# Patient Record
Sex: MALE | Race: WHITE | NOT HISPANIC OR LATINO | URBAN - METROPOLITAN AREA
[De-identification: names, ages, dates, MRNs, and addresses within clinical notes are randomized per-mention and may not be internally consistent; named-entity substitution may affect disease eponyms.]

---

## 2023-04-07 ENCOUNTER — APPOINTMENT (OUTPATIENT)
Dept: LAB | Facility: HOSPITAL | Age: 33
End: 2023-04-07
Attending: OTOLARYNGOLOGY

## 2023-04-07 DIAGNOSIS — Z01.818 PRE-OPERATIVE EXAMINATION: ICD-10-CM

## 2023-04-07 DIAGNOSIS — J34.2 NASAL SEPTAL DEVIATION: ICD-10-CM

## 2023-04-07 DIAGNOSIS — J34.3 NASAL TURBINATE HYPERTROPHY: ICD-10-CM

## 2023-04-07 LAB
ANION GAP SERPL CALCULATED.3IONS-SCNC: 6 MMOL/L (ref 4–13)
BASOPHILS # BLD AUTO: 0.02 THOUSANDS/ÂΜL (ref 0–0.1)
BASOPHILS NFR BLD AUTO: 0 % (ref 0–1)
BUN SERPL-MCNC: 13 MG/DL (ref 5–25)
CALCIUM SERPL-MCNC: 10 MG/DL (ref 8.4–10.2)
CHLORIDE SERPL-SCNC: 105 MMOL/L (ref 96–108)
CO2 SERPL-SCNC: 30 MMOL/L (ref 21–32)
CREAT SERPL-MCNC: 1.03 MG/DL (ref 0.6–1.3)
EOSINOPHIL # BLD AUTO: 0.03 THOUSAND/ÂΜL (ref 0–0.61)
EOSINOPHIL NFR BLD AUTO: 1 % (ref 0–6)
ERYTHROCYTE [DISTWIDTH] IN BLOOD BY AUTOMATED COUNT: 12.4 % (ref 11.6–15.1)
GFR SERPL CREATININE-BSD FRML MDRD: 94 ML/MIN/1.73SQ M
GLUCOSE SERPL-MCNC: 63 MG/DL (ref 65–140)
HCT VFR BLD AUTO: 45.1 % (ref 36.5–49.3)
HGB BLD-MCNC: 15.5 G/DL (ref 12–17)
IMM GRANULOCYTES # BLD AUTO: 0.01 THOUSAND/UL (ref 0–0.2)
IMM GRANULOCYTES NFR BLD AUTO: 0 % (ref 0–2)
LYMPHOCYTES # BLD AUTO: 1.48 THOUSANDS/ÂΜL (ref 0.6–4.47)
LYMPHOCYTES NFR BLD AUTO: 33 % (ref 14–44)
MCH RBC QN AUTO: 31.4 PG (ref 26.8–34.3)
MCHC RBC AUTO-ENTMCNC: 34.4 G/DL (ref 31.4–37.4)
MCV RBC AUTO: 91 FL (ref 82–98)
MONOCYTES # BLD AUTO: 0.52 THOUSAND/ÂΜL (ref 0.17–1.22)
MONOCYTES NFR BLD AUTO: 12 % (ref 4–12)
NEUTROPHILS # BLD AUTO: 2.44 THOUSANDS/ÂΜL (ref 1.85–7.62)
NEUTS SEG NFR BLD AUTO: 54 % (ref 43–75)
NRBC BLD AUTO-RTO: 0 /100 WBCS
PLATELET # BLD AUTO: 255 THOUSANDS/UL (ref 149–390)
PMV BLD AUTO: 9.9 FL (ref 8.9–12.7)
POTASSIUM SERPL-SCNC: 3.7 MMOL/L (ref 3.5–5.3)
RBC # BLD AUTO: 4.94 MILLION/UL (ref 3.88–5.62)
SODIUM SERPL-SCNC: 141 MMOL/L (ref 135–147)
WBC # BLD AUTO: 4.5 THOUSAND/UL (ref 4.31–10.16)

## 2023-04-24 RX ORDER — DIPHENOXYLATE HYDROCHLORIDE AND ATROPINE SULFATE 2.5; .025 MG/1; MG/1
1 TABLET ORAL DAILY
COMMUNITY

## 2023-04-24 NOTE — PRE-PROCEDURE INSTRUCTIONS
Pre-Surgery Instructions:   Medication Instructions   • multivitamin (THERAGRAN) TABS Stop taking 7 days prior to surgery  Medication instructions for day surgery reviewed  Please use only a sip of water to take your instructed medications  Avoid all over the counter vitamins, supplements and NSAIDS for one week prior to surgery per anesthesia guidelines  Tylenol is ok to take as needed  You will receive a call one business day prior to surgery with an arrival time and hospital directions  If your surgery is scheduled on a Monday, the hospital will be calling you on the Friday prior to your surgery  If you have not heard from anyone by 8pm, please call the hospital supervisor through the hospital  at [phone]  Tiffanie Poll [phone])  Do not eat or drink anything after midnight the night before your surgery, including candy, mints, lifesavers, or chewing gum  Do not drink alcohol 24hrs before your surgery  Try not to smoke at least 24hrs before your surgery  Follow the pre surgery showering instructions as listed in the [facility] Surgical Experience Booklet” or otherwise provided by your surgeon's office  Do not shave the surgical area 24 hours before surgery  Do not apply any lotions, creams, including makeup, cologne, deodorant, or perfumes after showering on the day of your surgery  No contact lenses, eye make-up, or artificial eyelashes  Remove nail polish, including gel polish, and any artificial, gel, or acrylic nails if possible  Remove all jewelry including rings and body piercing jewelry  Wear causal clothing that is easy to take on and off  Consider your type of surgery  Keep any valuables, jewelry, piercings at home  Please bring any specially ordered equipment (sling, braces) if indicated  Arrange for a responsible person to drive you to and from the hospital on the day of your surgery  Visitor Guidelines discussed       Call the surgeon's office with any new illnesses, exposures, or additional questions prior to surgery  Please reference your [facility] Surgical Experience Booklet” for additional information to prepare for your upcoming surgery

## 2023-04-28 ENCOUNTER — ANESTHESIA (OUTPATIENT)
Dept: PERIOP | Facility: HOSPITAL | Age: 33
End: 2023-04-28

## 2023-04-28 ENCOUNTER — HOSPITAL ENCOUNTER (OUTPATIENT)
Facility: HOSPITAL | Age: 33
Setting detail: OUTPATIENT SURGERY
Discharge: HOME | End: 2023-04-28
Attending: OTOLARYNGOLOGY | Admitting: OTOLARYNGOLOGY

## 2023-04-28 ENCOUNTER — ANESTHESIA EVENT (OUTPATIENT)
Dept: PERIOP | Facility: HOSPITAL | Age: 33
End: 2023-04-28

## 2023-04-28 VITALS
DIASTOLIC BLOOD PRESSURE: 58 MMHG | WEIGHT: 215 LBS | TEMPERATURE: 98.2 F | RESPIRATION RATE: 22 BRPM | HEIGHT: 73 IN | OXYGEN SATURATION: 96 % | HEART RATE: 86 BPM | BODY MASS INDEX: 28.49 KG/M2 | SYSTOLIC BLOOD PRESSURE: 127 MMHG

## 2023-04-28 DIAGNOSIS — Z98.890 STATUS POST NASAL SEPTOPLASTY: Primary | ICD-10-CM

## 2023-04-28 RX ORDER — GLYCOPYRROLATE 0.2 MG/ML
INJECTION INTRAMUSCULAR; INTRAVENOUS AS NEEDED
Status: DISCONTINUED | OUTPATIENT
Start: 2023-04-28 | End: 2023-04-28

## 2023-04-28 RX ORDER — ACETAMINOPHEN 325 MG/1
650 TABLET ORAL EVERY 6 HOURS PRN
Status: DISCONTINUED | OUTPATIENT
Start: 2023-04-28 | End: 2023-04-28 | Stop reason: HOSPADM

## 2023-04-28 RX ORDER — HYDROCODONE BITARTRATE AND ACETAMINOPHEN 5; 325 MG/1; MG/1
1 TABLET ORAL EVERY 6 HOURS PRN
Status: DISCONTINUED | OUTPATIENT
Start: 2023-04-28 | End: 2023-04-28 | Stop reason: HOSPADM

## 2023-04-28 RX ORDER — OXYCODONE HYDROCHLORIDE AND ACETAMINOPHEN 5; 325 MG/1; MG/1
1 TABLET ORAL EVERY 4 HOURS PRN
Qty: 12 TABLET | Refills: 0 | Status: SHIPPED | OUTPATIENT
Start: 2023-04-28 | End: 2023-05-08

## 2023-04-28 RX ORDER — ONDANSETRON 2 MG/ML
4 INJECTION INTRAMUSCULAR; INTRAVENOUS ONCE AS NEEDED
Status: COMPLETED | OUTPATIENT
Start: 2023-04-28 | End: 2023-04-28

## 2023-04-28 RX ORDER — AMOXICILLIN AND CLAVULANATE POTASSIUM 875; 125 MG/1; MG/1
1 TABLET, FILM COATED ORAL EVERY 12 HOURS SCHEDULED
Qty: 14 TABLET | Refills: 0 | Status: SHIPPED | OUTPATIENT
Start: 2023-04-28 | End: 2023-05-05

## 2023-04-28 RX ORDER — LIDOCAINE HYDROCHLORIDE AND EPINEPHRINE 10; 10 MG/ML; UG/ML
INJECTION, SOLUTION INFILTRATION; PERINEURAL AS NEEDED
Status: DISCONTINUED | OUTPATIENT
Start: 2023-04-28 | End: 2023-04-28 | Stop reason: HOSPADM

## 2023-04-28 RX ORDER — NEOSTIGMINE METHYLSULFATE 1 MG/ML
INJECTION INTRAVENOUS AS NEEDED
Status: DISCONTINUED | OUTPATIENT
Start: 2023-04-28 | End: 2023-04-28

## 2023-04-28 RX ORDER — CEFAZOLIN SODIUM 2 G/50ML
SOLUTION INTRAVENOUS AS NEEDED
Status: DISCONTINUED | OUTPATIENT
Start: 2023-04-28 | End: 2023-04-28

## 2023-04-28 RX ORDER — OXYCODONE HYDROCHLORIDE AND ACETAMINOPHEN 5; 325 MG/1; MG/1
1 TABLET ORAL EVERY 4 HOURS PRN
Qty: 6 TABLET | Refills: 0 | Status: SHIPPED | OUTPATIENT
Start: 2023-04-28

## 2023-04-28 RX ORDER — PROPOFOL 10 MG/ML
INJECTION, EMULSION INTRAVENOUS AS NEEDED
Status: DISCONTINUED | OUTPATIENT
Start: 2023-04-28 | End: 2023-04-28

## 2023-04-28 RX ORDER — GINSENG 100 MG
CAPSULE ORAL AS NEEDED
Status: DISCONTINUED | OUTPATIENT
Start: 2023-04-28 | End: 2023-04-28 | Stop reason: HOSPADM

## 2023-04-28 RX ORDER — SODIUM CHLORIDE, SODIUM LACTATE, POTASSIUM CHLORIDE, CALCIUM CHLORIDE 600; 310; 30; 20 MG/100ML; MG/100ML; MG/100ML; MG/100ML
INJECTION, SOLUTION INTRAVENOUS CONTINUOUS PRN
Status: DISCONTINUED | OUTPATIENT
Start: 2023-04-28 | End: 2023-04-28

## 2023-04-28 RX ORDER — ROCURONIUM BROMIDE 10 MG/ML
INJECTION, SOLUTION INTRAVENOUS AS NEEDED
Status: DISCONTINUED | OUTPATIENT
Start: 2023-04-28 | End: 2023-04-28

## 2023-04-28 RX ORDER — LIDOCAINE HYDROCHLORIDE 20 MG/ML
INJECTION, SOLUTION EPIDURAL; INFILTRATION; INTRACAUDAL; PERINEURAL AS NEEDED
Status: DISCONTINUED | OUTPATIENT
Start: 2023-04-28 | End: 2023-04-28

## 2023-04-28 RX ORDER — HYDROMORPHONE HCL/PF 1 MG/ML
0.5 SYRINGE (ML) INJECTION
Status: DISCONTINUED | OUTPATIENT
Start: 2023-04-28 | End: 2023-04-28 | Stop reason: HOSPADM

## 2023-04-28 RX ORDER — FENTANYL CITRATE 50 UG/ML
INJECTION, SOLUTION INTRAMUSCULAR; INTRAVENOUS AS NEEDED
Status: DISCONTINUED | OUTPATIENT
Start: 2023-04-28 | End: 2023-04-28

## 2023-04-28 RX ORDER — MIDAZOLAM HYDROCHLORIDE 2 MG/2ML
INJECTION, SOLUTION INTRAMUSCULAR; INTRAVENOUS AS NEEDED
Status: DISCONTINUED | OUTPATIENT
Start: 2023-04-28 | End: 2023-04-28

## 2023-04-28 RX ORDER — COCAINE HYDROCHLORIDE 40 MG/ML
SOLUTION NASAL AS NEEDED
Status: DISCONTINUED | OUTPATIENT
Start: 2023-04-28 | End: 2023-04-28 | Stop reason: HOSPADM

## 2023-04-28 RX ORDER — DEXAMETHASONE SODIUM PHOSPHATE 10 MG/ML
INJECTION, SOLUTION INTRAMUSCULAR; INTRAVENOUS AS NEEDED
Status: DISCONTINUED | OUTPATIENT
Start: 2023-04-28 | End: 2023-04-28

## 2023-04-28 RX ORDER — METOCLOPRAMIDE HYDROCHLORIDE 5 MG/ML
10 INJECTION INTRAMUSCULAR; INTRAVENOUS ONCE AS NEEDED
Status: CANCELLED | OUTPATIENT
Start: 2023-04-28

## 2023-04-28 RX ORDER — ONDANSETRON 2 MG/ML
INJECTION INTRAMUSCULAR; INTRAVENOUS AS NEEDED
Status: DISCONTINUED | OUTPATIENT
Start: 2023-04-28 | End: 2023-04-28

## 2023-04-28 RX ORDER — FENTANYL CITRATE/PF 50 MCG/ML
25 SYRINGE (ML) INJECTION
Status: DISCONTINUED | OUTPATIENT
Start: 2023-04-28 | End: 2023-04-28 | Stop reason: HOSPADM

## 2023-04-28 RX ADMIN — MIDAZOLAM 2 MG: 1 INJECTION INTRAMUSCULAR; INTRAVENOUS at 15:30

## 2023-04-28 RX ADMIN — PROPOFOL 200 MG: 10 INJECTION, EMULSION INTRAVENOUS at 15:33

## 2023-04-28 RX ADMIN — DEXAMETHASONE SODIUM PHOSPHATE 10 MG: 10 INJECTION, SOLUTION INTRAMUSCULAR; INTRAVENOUS at 15:45

## 2023-04-28 RX ADMIN — GLYCOPYRROLATE 0.6 MG: 0.2 INJECTION, SOLUTION INTRAMUSCULAR; INTRAVENOUS at 16:47

## 2023-04-28 RX ADMIN — FENTANYL CITRATE 50 MCG: 50 INJECTION, SOLUTION INTRAMUSCULAR; INTRAVENOUS at 15:33

## 2023-04-28 RX ADMIN — ROCURONIUM BROMIDE 50 MG: 10 INJECTION INTRAVENOUS at 15:34

## 2023-04-28 RX ADMIN — ONDANSETRON 4 MG: 2 INJECTION INTRAMUSCULAR; INTRAVENOUS at 17:24

## 2023-04-28 RX ADMIN — NEOSTIGMINE METHYLSULFATE 3 MG: 1 INJECTION INTRAVENOUS at 16:47

## 2023-04-28 RX ADMIN — LIDOCAINE HYDROCHLORIDE 100 MG: 20 INJECTION, SOLUTION EPIDURAL; INFILTRATION; INTRACAUDAL; PERINEURAL at 15:33

## 2023-04-28 RX ADMIN — SODIUM CHLORIDE, SODIUM LACTATE, POTASSIUM CHLORIDE, AND CALCIUM CHLORIDE: .6; .31; .03; .02 INJECTION, SOLUTION INTRAVENOUS at 15:33

## 2023-04-28 RX ADMIN — CEFAZOLIN SODIUM 2000 MG: 2 SOLUTION INTRAVENOUS at 15:42

## 2023-04-28 RX ADMIN — ONDANSETRON 4 MG: 2 INJECTION INTRAMUSCULAR; INTRAVENOUS at 15:45

## 2023-04-28 RX ADMIN — FENTANYL CITRATE 50 MCG: 50 INJECTION, SOLUTION INTRAMUSCULAR; INTRAVENOUS at 15:39

## 2023-04-28 NOTE — OP NOTE
OPERATIVE REPORT  PATIENT NAME: Agapito Santana    :  1990  MRN: [id number]  Pt Location: [facility] OR ROOM 02    SURGERY DATE: 2023    Surgeon(s) and Role:     * Lloyd Forbes MD - Primary    Preop Diagnosis:  Nasal septal deviation [J34 2]  Nasal turbinate hypertrophy [J34 3]    Post-Op Diagnosis Codes:     * Nasal septal deviation [J34 2]     * Nasal turbinate hypertrophy [J34 3]    Procedure(s):  SEPTOPLASTY/ BILATERAL INFERIOR TURBINATE REDUCTION  Bilateral - BILATERAL INFERIOR TURBINATE REDUCTION    Specimen(s):  * No specimens in log *    Estimated Blood Loss:   Minimal    Drains:  * No LDAs found *    Anesthesia Type:   General    Operative Indications:  Nasal septal deviation [J34 2]  Nasal turbinate hypertrophy [J34 3]      Operative Findings:  Deviated septum and bilateral inferior turbinate hypertrophy    Complications:   None    Procedure and Technique:      Operative indications: The patient presents with longstanding nasal obstruction refractory to medical management  The risks, benefits and alternatives of septoplasty and bilateral inferior turbinate reduction were discussed preoperatively and signed informed consent was obtained  Operative course: The patient was identified and brought into the operating room and placed on the operating table in a supine position  General anesthesia was induced  A timeout was performed  The patient was prepped and draped in the usual fashion  The nasal septum was injected with 1% lidocaine with 1:100,000 epinephrine in a submucosal plane  4% soaked cocaine pledgets were inserted into the nose bilaterally  The pledgets were removed and the operation was begun  A hemitransfixion incision was made on the right side with a #15 blade  Submucoperichondrial flaps were elevated about the nasal septum bilaterally  The endoscope was introduced between these flaps   A portion of the quadrangular cartilage was resected with a swivel knife leaving greater than 1 cm caudal and dorsal struts for nasal tip support  Next, under endoscopic guidance, any additional bony or cartilaginous deviations were removed  Any deviation of the maxillary crest was freed from the overlying mucosa and reduced with an osteotome  Drainage holes were seen to be created on one mucoperichondrial flap  After adequate straightening of the septal bone and cartilage, the mucoperichondrial flaps were reapproximated with 4-0 chromic suture in a quilting fashion  The hemitransfixion incision was closed with interrupted 4-0 chromic sutures  Next, under endoscopic guidance a bilateral inferior turbinate reduction was performed  The turbinates were injected bilaterally with 1% lidocaine with 1:100,000 epinephrine  Bilateral submucosal tunnels were created along the long-axis of the turbinates with the Turbinator wand and any excess stromal soft tissue and bone was submucosally removed  Hemostasis was achieved with cautery  The turbinates were gently lateralized with a [place] bar  Bilateral Will splints were then covered in Bacitracin and inserted and secured to the nasal septum with 3-0 prolene suture  The patient was then gently awakened from general anesthesia and transported to the PACU in stable condition  The procedure was tolerated well  I was present for the entire procedure      Patient Disposition:  PACU         SIGNATURE: Zain Rebollar MD  DATE: April 28, 2023  TIME: 4:58 PM

## 2023-04-28 NOTE — ANESTHESIA PREPROCEDURE EVALUATION
Procedure:  SEPTOPLASTY/ BILATERAL INFERIOR TURBINATE REDUCTION (Nose)  BILATERAL INFERIOR TURBINATE REDUCTION (Bilateral: Nose)    Relevant Problems   No relevant active problems   Recent URI with residual Cuogh     Physical Exam    Airway    Mallampati score: II  TM Distance: >3 FB  Neck ROM: full     Dental   No notable dental hx     Cardiovascular      Pulmonary  Breath sounds clear to auscultation,     Other Findings        Anesthesia Plan  ASA Score- 2     Anesthesia Type- general with ASA Monitors  Additional Monitors:   Airway Plan: ETT  Plan Factors-Exercise tolerance (METS): >4 METS  Chart reviewed  Patient is not a current smoker  Induction- intravenous  Postoperative Plan-     Informed Consent- Anesthetic plan and risks discussed with patient and spouse  I personally reviewed this patient with the CRNA  Discussed and agreed on the Anesthesia Plan with the CRNA  Harley Yee

## 2023-04-28 NOTE — INTERVAL H&P NOTE
"/77   Pulse 75   Temp 97 5 °F (36 4 °C) (Temporal)   Resp 18   Ht 6' 1\" (1 854 m)   Wt 97 5 kg (215 lb)   SpO2 100%   BMI 28 37 kg/m²   Head: atruamatic, normencephalic  CV: RRR  RESP: CTAB  ABD: soft and supple  Extremities: no cyanosis, clubbing or edema    H&P reviewed  After examining the patient I find no changes in the patients condition since the H&P had been written      Vitals:    04/28/23 1343   BP: 133/77   Pulse: 75   Resp: 18   Temp: 97 5 °F (36 4 °C)   SpO2: 100%     "

## 2023-04-28 NOTE — ANESTHESIA POSTPROCEDURE EVALUATION
Post-Op Assessment Note    CV Status:  Stable  Pain Score: 0    Pain management: adequate     Mental Status:  Arousable   Hydration Status:  Stable   PONV Controlled:  None   Airway Patency:  Patent      Post Op Vitals Reviewed: Yes      Staff: CRNA         No notable events documented      BP      Temp      Pulse     Resp      SpO2

## 2023-04-28 NOTE — DISCHARGE INSTR - AVS FIRST PAGE
General directions: You have just had sinus/nasal surgery  You will likely have light bleeding from both your nostrils  You can tape gauze under your nostrils to catch the bleeding  Light bleeding is expected to improved within 24-72 hours following surgery  You will likely find it helpful to sleep somewhat inclined in bed or on a recliner for the next several days  This will decrease your sinus pressure and help the blood drain better  About a 30 degree angle is ideal   Try to avoid brisk activity and heavy lifting  These activities increase bleeding from your nose  Activity:  Light activity, no nose blowing, sneeze with your mouth open, change nasal drip pad as necessary, take your antibiotics as scheduled    Diet: Resume your regular diet    Special Instructions: Call or return with questions, concerns, or return of symptoms and No alcoholic drinks, driving, or operating heavy machinery while on prescribed pain medications    Call your physician for:  Shortness of breath that does not improve, Fever above 101ºF (38  3ºC) or shaking chills, Increasing sputum, Chest pain develops or worsens, Pain not controlled by the medication prescribed for you, Increased redness, swelling or drainage around your wound   or Questions or concerns

## 2023-04-28 NOTE — H&P
Sally Gillespie is a 35 y o  who presents with a chief complaint of nasal obstruction     Pertinent elements of the history include:  He presents with nasal obstruction  Alternates from side to side  History of nasal fracture in high school  Had surgery to reconstruct it at the time  Some scabbing and bleeding anteriorly, usually on the left side  Denies nasal allergies  Some post nasal drip  Some URIs from PND but this has improved with age  AM headaches, improves with hydration  Headache is vertex based  Correlates with nasal obstruction  For his symptoms he has tried saline spray, helps with bleeding  He has not tried nasal steroids in the past  Both sides are equally obstruction         Review of systems: Pertinent review of systems documented in the HPI  10 point ROS documented in a separate note, as necessary      Results reviewed; images from any scan have been personally reviewed:           The past medical, surgical, social and family history have been reviewed as documented in today's record      Medical History   History reviewed  No pertinent past medical history         Surgical History         Past Surgical History:   Procedure Laterality Date   • NASAL RECONSTRUCTION       • WISDOM TOOTH EXTRACTION   2007            Family History   History reviewed  No pertinent family history         Social History               Socioeconomic History   • Marital status: Unknown       Spouse name: Not on file   • Number of children: Not on file   • Years of education: Not on file   • Highest education level: Not on file   Occupational History   • Not on file   Tobacco Use   • Smoking status: Not on file   • Smokeless tobacco: Not on file   Vaping Use   • Vaping Use: Never used   Substance and Sexual Activity   • Alcohol use:  Yes       Comment: socially   • Drug use: Not on file   • Sexual activity: Not on file   Other Topics Concern   • Not on file   Social History Narrative   • Not on file      Social Determinants of Health      Financial Resource Strain: Not on file   Food Insecurity: Not on file   Transportation Needs: Not on file   Physical Activity: Not on file   Stress: Not on file   Social Connections: Not on file   Intimate Partner Violence: Not on file   Housing Stability: Not on file            No current outpatient medications on file prior to visit       No current facility-administered medications on file prior to visit          Physical exam:   There were no vitals taken for this visit      Constitutional:  Well developed, well nourished and groomed, in no acute distress       Eyes:  Extra-ocular movements intact, pupils equally round and reactive to light and accommodation, the lids and conjunctivae are normal in appearance      Head: Atraumatic, normocephalic, no visible scalp lesions, bony palpation unremarkable without stepoffs, parotid and submandibular salivary glands non-tender to palpation and without masses bilaterally       Ears:  Auricles normal in appearance bilaterally, mastoid prominence non-tender, external auditory canals clear bilaterally  Tympanic membranes intact  Normal appearing ossicles       Nose/Sinuses:  External appearance unremarkable, no maxillary or frontal sinus tenderness to palpation bilaterally   Anterior rhinoscopy reveals: bilateral mid septal cartilaginous deflection with mucoid edema of the inferior turbinates/hypertrophy and mucoid exudate     Oral Cavity:  Moist mucus membranes, gums and dentition unremarkable, no oral mucosal masses or lesions, floor of mouth soft, tongue mobile without masses or lesions       Oropharynx:  Base of tongue soft and without masses, tonsils bilaterally unremarkable, soft palate mucosa unremarkable        Neck:  No visible or palpable cervical lesions or lymphadenopathy, thyroid gland is normal in size and symmetry and without masses, normal laryngeal elevation with swallowing       Cardiovascular:  Normal rate and rhythm, no palpable thrills, no jugulovenous distension observed  Respiratory:  Normal respiratory effort without evidence of retractions or use of accessory muscles  Integument:  Normal appearing without observed masses or lesions  Neurologic:  Cranial nerves II-XII intact bilaterally  Psychiatric:  Alert and oriented to time, place and person, normal affect      Procedures           Assessment:   1  Nasal septal deviation          2  Nasal turbinate hypertrophy                Orders  No orders of the defined types were placed in this encounter            Discussion/Plan:     1  He presents with long-standing posttraumatic nasal obstruction bilaterally with associated mucoid edema and turbinate hypertrophy  I recommended a septoplasty and bilateral inferior turbinate reduction  We discussed the surgery in detail  There is the remote possibility that he had prior septal surgery as part of his posttraumatic reconstruction although there is not clear evidence of this on exam   Surgical risks include bleeding, infection, recurrence, scarring, septal perforation, saddle nose deformity, and the need for additional surgery  His informed consent was obtained

## 2023-04-30 NOTE — ANESTHESIA POSTPROCEDURE EVALUATION
Post-Op Assessment Note        Airway: intubated       Reason for prolonged intubation > 24 hours:  Ectubated at end of surgeryReason for prolonged intubation > 48 hours:  Extubated at end of surgery  No notable events documented      BP      Temp      Pulse     Resp      SpO2

## 2024-05-15 NOTE — PROGRESS NOTES
[facility]   Keira COREY    Assessment/Plan:   1. Establishing care with new doctor, encounter for  2. Foot pain, left  -     XR foot 3+ vw left; Future; Expected date: 05/16/2024  3. Screening for thyroid disorder  -     TSH, 3rd generation with Free T4 reflex; Future  -     TSH, 3rd generation with Free T4 reflex  4. Screening for deficiency anemia  -     CBC and differential; Future  -     CBC and differential  5. Screening for lipid disorders  -     Lipid panel; Future  -     Lipid panel  6. Vitamin D deficiency  -     Vitamin D 25 hydroxy; Future  -     Vitamin D 25 hydroxy  7. Hypoglycemia  -     Comprehensive metabolic panel; Future  -     Comprehensive metabolic panel      Blood work no eating/caffeine for 8 hours.  Drink plenty of water  Left foot xray.  Consider podiatry consult.   Return for Annual physical, F/U after bloodwork.  Patient may call or return to office with any questions or concerns.     ______________________________________________________________________  Subjective:     Patient ID: Anders King is a 34 y.o. male.  Anders King  Chief Complaint   Patient presents with    Establish Care     Here to establish care.  Hasn't seen a PCP in a few years    PMH: none  SHx: deviated septum, ureter surgery at 4yr old.  FHx: dad with MS/AD, mom with UC.  Brother arthritis, half sister (unclear)     with 2 kids 4yr girl and 10 month old son.    Work:  self employed   Physical:  yardwork, kids, house work.   Tobacco: none  Alcohol: few drinks a week  Marijuana: none  Diet:  intermittent diet.  Feels better when he skips breakfast.  Organic and limits sugar.  Drinks 32 ounces regular coffee daily    Reports chronic left foot pain ball of foot between 2nd/3rd toes.  On his feet a lot with work and thinks it was due to repetitive motion injury initially, onset 1yr ago.  Tried shoe inserts with increased pain, started wearing flat soled shoes with  improvement, however no resolution.  Pain intermittent and persistent.                   The following portions of the patient's history were reviewed and updated as appropriate: allergies, current medications, past family history, past medical history, past social history, past surgical history, and problem list.    Review of Systems   Constitutional: Negative.  Negative for activity change, appetite change, chills, fatigue, fever and unexpected weight change.   HENT: Negative.  Negative for congestion, ear pain, postnasal drip, sinus pain and trouble swallowing.    Eyes:  Positive for visual disturbance.        Wears corrected lens   Respiratory: Negative.  Negative for cough and shortness of breath.    Cardiovascular: Negative.  Negative for chest pain and leg swelling.   Gastrointestinal:  Negative for constipation and diarrhea.   Endocrine: Negative.    Genitourinary: Negative.  Negative for difficulty urinating and dysuria.   Musculoskeletal: Negative.    Skin: Negative.    Allergic/Immunologic: Negative.  Negative for immunocompromised state.   Neurological:  Positive for headaches. Negative for dizziness and light-headedness.        Wakes up with occasional dull HA.  Trying to stay hydrated.     Hematological: Negative.    Psychiatric/Behavioral: Negative.  Negative for sleep disturbance.          Objective:      Vitals:    05/16/24 0949   BP: 122/80   Pulse: 69   SpO2: 99%      Physical Exam  Vitals and nursing note reviewed.   Constitutional:       Appearance: Normal appearance.   HENT:      Head: Normocephalic and atraumatic.      Right Ear: Tympanic membrane, ear canal and external ear normal.      Left Ear: Tympanic membrane, ear canal and external ear normal.      Nose: Nose normal.      Mouth/Throat:      Mouth: Mucous membranes are moist.      Pharynx: Oropharynx is clear.   Eyes:      Extraocular Movements: Extraocular movements intact.      Conjunctiva/sclera: Conjunctivae normal.      Pupils:  "Pupils are equal, round, and reactive to light.   Cardiovascular:      Rate and Rhythm: Normal rate and regular rhythm.      Pulses: Normal pulses.      Heart sounds: Normal heart sounds.   Pulmonary:      Effort: Pulmonary effort is normal.      Breath sounds: Normal breath sounds.   Abdominal:      General: Bowel sounds are normal.      Palpations: Abdomen is soft.   Musculoskeletal:         General: Normal range of motion.      Cervical back: Normal range of motion and neck supple.        Feet:    Feet:      Comments: Tenderness at MTPJs of foot between 2-3 toes.  No nodularity with palpation.    Flat arches bilaterally  Skin:     General: Skin is warm and dry.   Neurological:      General: No focal deficit present.      Mental Status: He is alert and oriented to person, place, and time.   Psychiatric:         Mood and Affect: Mood normal.         Behavior: Behavior normal.         Thought Content: Thought content normal.         Judgment: Judgment normal.           Portions of the record may have been created with voice recognition software. Occasional wrong word or \"sound alike\" substitutions may have occurred due to the inherent limitations of voice recognition software. Please review the chart carefully and recognize, using context, where substitutions/typographical errors may have occurred.       "

## 2024-05-16 ENCOUNTER — OFFICE VISIT (OUTPATIENT)
Dept: FAMILY MEDICINE CLINIC | Facility: HOSPITAL | Age: 34
End: 2024-05-16
Payer: COMMERCIAL

## 2024-05-16 VITALS
HEIGHT: 73 IN | BODY MASS INDEX: 27.04 KG/M2 | WEIGHT: 204 LBS | HEART RATE: 69 BPM | SYSTOLIC BLOOD PRESSURE: 122 MMHG | DIASTOLIC BLOOD PRESSURE: 80 MMHG | OXYGEN SATURATION: 99 %

## 2024-05-16 DIAGNOSIS — Z13.220 SCREENING FOR LIPID DISORDERS: ICD-10-CM

## 2024-05-16 DIAGNOSIS — Z13.0 SCREENING FOR DEFICIENCY ANEMIA: ICD-10-CM

## 2024-05-16 DIAGNOSIS — M79.672 FOOT PAIN, LEFT: ICD-10-CM

## 2024-05-16 DIAGNOSIS — Z76.89 ESTABLISHING CARE WITH NEW DOCTOR, ENCOUNTER FOR: Primary | ICD-10-CM

## 2024-05-16 DIAGNOSIS — E16.2 HYPOGLYCEMIA: ICD-10-CM

## 2024-05-16 DIAGNOSIS — Z13.29 SCREENING FOR THYROID DISORDER: ICD-10-CM

## 2024-05-16 DIAGNOSIS — E55.9 VITAMIN D DEFICIENCY: ICD-10-CM

## 2024-05-16 PROCEDURE — 99203 OFFICE O/P NEW LOW 30 MIN: CPT | Performed by: NURSE PRACTITIONER

## 2024-05-29 ENCOUNTER — APPOINTMENT (OUTPATIENT)
Dept: RADIOLOGY | Facility: CLINIC | Age: 34
End: 2024-05-29
Payer: COMMERCIAL

## 2024-05-29 DIAGNOSIS — M79.672 FOOT PAIN, LEFT: ICD-10-CM

## 2024-05-29 PROCEDURE — 73630 X-RAY EXAM OF FOOT: CPT

## 2024-05-30 DIAGNOSIS — G89.29 CHRONIC FOOT PAIN, LEFT: Primary | ICD-10-CM

## 2024-05-30 DIAGNOSIS — M79.672 CHRONIC FOOT PAIN, LEFT: Primary | ICD-10-CM

## 2024-05-30 LAB
25(OH)D3+25(OH)D2 SERPL-MCNC: 31.4 NG/ML (ref 30–100)
ALBUMIN SERPL-MCNC: 4.6 G/DL (ref 4.1–5.1)
ALBUMIN/GLOB SERPL: 2.2 {RATIO} (ref 1.2–2.2)
ALP SERPL-CCNC: 59 IU/L (ref 44–121)
ALT SERPL-CCNC: 9 IU/L (ref 0–44)
AST SERPL-CCNC: 16 IU/L (ref 0–40)
BASOPHILS # BLD AUTO: 0 X10E3/UL (ref 0–0.2)
BASOPHILS NFR BLD AUTO: 1 %
BILIRUB SERPL-MCNC: 0.7 MG/DL (ref 0–1.2)
BUN SERPL-MCNC: 10 MG/DL (ref 6–20)
BUN/CREAT SERPL: 11 (ref 9–20)
CALCIUM SERPL-MCNC: 9.6 MG/DL (ref 8.7–10.2)
CHLORIDE SERPL-SCNC: 105 MMOL/L (ref 96–106)
CHOLEST SERPL-MCNC: 155 MG/DL (ref 100–199)
CHOLEST/HDLC SERPL: 2.8 RATIO (ref 0–5)
CO2 SERPL-SCNC: 23 MMOL/L (ref 20–29)
CREAT SERPL-MCNC: 0.89 MG/DL (ref 0.76–1.27)
EGFR: 115 ML/MIN/1.73
EOSINOPHIL # BLD AUTO: 0.1 X10E3/UL (ref 0–0.4)
EOSINOPHIL NFR BLD AUTO: 2 %
ERYTHROCYTE [DISTWIDTH] IN BLOOD BY AUTOMATED COUNT: 12.1 % (ref 11.6–15.4)
GLOBULIN SER-MCNC: 2.1 G/DL (ref 1.5–4.5)
GLUCOSE SERPL-MCNC: 93 MG/DL (ref 70–99)
HCT VFR BLD AUTO: 40.5 % (ref 37.5–51)
HDLC SERPL-MCNC: 56 MG/DL
HGB BLD-MCNC: 13.9 G/DL (ref 13–17.7)
IMM GRANULOCYTES # BLD: 0 X10E3/UL (ref 0–0.1)
IMM GRANULOCYTES NFR BLD: 0 %
LDLC SERPL CALC-MCNC: 90 MG/DL (ref 0–99)
LYMPHOCYTES # BLD AUTO: 1.2 X10E3/UL (ref 0.7–3.1)
LYMPHOCYTES NFR BLD AUTO: 34 %
MCH RBC QN AUTO: 31.8 PG (ref 26.6–33)
MCHC RBC AUTO-ENTMCNC: 34.3 G/DL (ref 31.5–35.7)
MCV RBC AUTO: 93 FL (ref 79–97)
MONOCYTES # BLD AUTO: 0.4 X10E3/UL (ref 0.1–0.9)
MONOCYTES NFR BLD AUTO: 12 %
NEUTROPHILS # BLD AUTO: 1.7 X10E3/UL (ref 1.4–7)
NEUTROPHILS NFR BLD AUTO: 51 %
PLATELET # BLD AUTO: 219 X10E3/UL (ref 150–450)
POTASSIUM SERPL-SCNC: 4.1 MMOL/L (ref 3.5–5.2)
PROT SERPL-MCNC: 6.7 G/DL (ref 6–8.5)
RBC # BLD AUTO: 4.37 X10E6/UL (ref 4.14–5.8)
SL AMB VLDL CHOLESTEROL CALC: 9 MG/DL (ref 5–40)
SODIUM SERPL-SCNC: 141 MMOL/L (ref 134–144)
TRIGL SERPL-MCNC: 41 MG/DL (ref 0–149)
TSH SERPL DL<=0.005 MIU/L-ACNC: 1.46 UIU/ML (ref 0.45–4.5)
WBC # BLD AUTO: 3.4 X10E3/UL (ref 3.4–10.8)

## 2024-06-12 NOTE — PROGRESS NOTES
History of Present Illness   Well Adult Physical   Patient here for a comprehensive physical exam.  Here for physical.  Discussed recent labwork as well as left foot xray.           Diet and Physical Activity  Diet: intermittent diet.  Feels better when he skips breakfast.  Organic and limits sugar.  Drinks 32 ounces regular coffee daily  Weight concerns: Patient is overweight (BMI 25.0-29.9)  Exercise: intermittently   EtOH: rare/occasional/daily/social/none: occasional     Depression Screen  PHQ-2/9 Depression Screening              General Health  Hearing: Normal:  bilateral  Vision: goes for regular eye exams and wears glasses  Dental: regular dental visits    Cancer Screening  Colononoscopy N/A  PSA N/A    Smoker never   Annual screening with low-dose helical computed tomography (CT) for patients age 55 to 74 years with history of smoking at least 30 pack-years and, if a former smoker, had quit within the previous 15 years         The following portions of the patient's history were reviewed and updated as appropriate: allergies, current medications, past family history, past medical history, past social history, past surgical history and problem list.    Review of Systems     Review of Systems   Constitutional: Negative.  Negative for activity change, appetite change, chills, fatigue and fever.   HENT: Negative.  Negative for congestion, ear pain, postnasal drip and sinus pain.    Eyes: Negative.    Respiratory: Negative.  Negative for cough, chest tightness and shortness of breath.    Cardiovascular: Negative.  Negative for chest pain and leg swelling.   Gastrointestinal: Negative.  Negative for constipation and diarrhea.   Endocrine: Negative.    Genitourinary: Negative.  Negative for difficulty urinating and dysuria.   Musculoskeletal:  Positive for arthralgias.   Skin: Negative.  Negative for color change.   Allergic/Immunologic: Negative.  Negative for immunocompromised state.   Neurological: Negative.   Negative for dizziness and light-headedness.   Hematological: Negative.    Psychiatric/Behavioral: Negative.  Negative for sleep disturbance.        Past Medical History     Past Medical History:   Diagnosis Date    Pneumonia     as a child & as a young adult    Reactive airway disease        Past Surgical History     Past Surgical History:   Procedure Laterality Date    NASAL RECONSTRUCTION      IA SEPTOPLASTY/SUBMUCOUS RESECJ W/WO CARTILAGE GRF N/A 4/28/2023    Procedure: SEPTOPLASTY/ BILATERAL INFERIOR TURBINATE REDUCTION;  Surgeon: Josue Lucas MD;  Location: [facility];  Service: ENT    IA SUBMUCOUS RESCJ INFERIOR TURBINATE PRTL/COMPL Bilateral 4/28/2023    Procedure: BILATERAL INFERIOR TURBINATE REDUCTION;  Surgeon: Josue Lucas MD;  Location:  MAIN OR;  Service: ENT    URETER SURGERY      reconstruction as a 4 y.o.    WISDOM TOOTH EXTRACTION  2007       Social History     Social History     Socioeconomic History    Marital status: Unknown     Spouse name: None    Number of children: None    Years of education: None    Highest education level: None   Occupational History    None   Tobacco Use    Smoking status: Never    Smokeless tobacco: Never   Vaping Use    Vaping status: Never Used   Substance and Sexual Activity    Alcohol use: Yes     Comment: 4 x month    Drug use: Never    Sexual activity: Yes     Partners: Female   Other Topics Concern    None   Social History Narrative    None     Social Determinants of Health     Financial Resource Strain: Not on file   Food Insecurity: Not on file   Transportation Needs: Not on file   Physical Activity: Not on file   Stress: Not on file   Social Connections: Not on file   Intimate Partner Violence: Not on file   Housing Stability: Not on file       Family History     Family History   Problem Relation Age of Onset    Ulcerative colitis Mother     No Known Problems Brother        Current Medications       Current Outpatient Medications:     multivitamin  "(THERAGRAN) TABS, Take 1 tablet by mouth daily, Disp: , Rfl:      Allergies     No Known Allergies    Objective     /80   Pulse 59   Ht 6' 0.25\" (1.835 m)   Wt 91.4 kg (201 lb 6.4 oz)   SpO2 99%   BMI 27.13 kg/m²      Physical Exam  Vitals and nursing note reviewed.   Constitutional:       General: He is awake.      Appearance: Normal appearance. He is overweight.   HENT:      Head: Normocephalic and atraumatic.      Right Ear: Tympanic membrane, ear canal and external ear normal.      Left Ear: Tympanic membrane, ear canal and external ear normal.      Nose: Nose normal.      Mouth/Throat:      Mouth: Mucous membranes are moist.      Pharynx: Oropharynx is clear.   Eyes:      Extraocular Movements: Extraocular movements intact.      Conjunctiva/sclera: Conjunctivae normal.      Pupils: Pupils are equal, round, and reactive to light.   Cardiovascular:      Rate and Rhythm: Normal rate and regular rhythm.      Pulses: Normal pulses.      Heart sounds: Normal heart sounds.   Pulmonary:      Effort: Pulmonary effort is normal.      Breath sounds: Normal breath sounds.   Abdominal:      General: Bowel sounds are normal.      Palpations: Abdomen is soft.   Musculoskeletal:         General: Normal range of motion.      Cervical back: Normal range of motion and neck supple.        Feet:    Feet:      Comments: Tenderness at MTPJs of foot between 2-3 toes without nodularity.  Flat arches bilaterally    Skin:     General: Skin is warm and dry.   Neurological:      General: No focal deficit present.      Mental Status: He is alert and oriented to person, place, and time.   Psychiatric:         Mood and Affect: Mood normal.         Behavior: Behavior normal. Behavior is cooperative.         Thought Content: Thought content normal.         Judgment: Judgment normal.           No results found.    Health Maintenance     Health Maintenance   Topic Date Due    Hepatitis C Screening  Never done    HIV Screening  Never " "done    COVID-19 Vaccine (1 - 2023-24 season) Never done    Influenza Vaccine (Season Ended) 09/01/2024    Depression Screening  05/16/2025    Annual Physical  06/13/2025    DTaP,Tdap,and Td Vaccines (8 - Td or Tdap) 09/11/2028    Zoster Vaccine (1 of 2) 01/04/2040    RSV Vaccine Age 60+ Years (1 - 1-dose 60+ series) 01/04/2050    HIB Vaccine  Completed    IPV Vaccine  Completed    RSV Vaccine age 0-20 Months  Aged Out    Pneumococcal Vaccine: Pediatrics (0 to 5 Years) and At-Risk Patients (6 to 64 Years)  Aged Out    Hepatitis A Vaccine  Aged Out    Meningococcal ACWY Vaccine  Aged Out    HPV Vaccine  Aged Out     Immunization History   Administered Date(s) Administered    DTP 1990, 1990, 1990, 12/06/1991, 08/03/1995    Hep B, Adolescent or Pediatric 03/30/2001, 05/16/2001, 10/03/2001    Hepatitis A 05/20/2009, 12/22/2010    HiB 01/07/1991, 04/17/1991    INFLUENZA 09/11/2018    MMR 04/17/1991, 03/17/2000    Meningococcal Polysaccharide (MPSV4) 02/08/2006    OPV 1990, 1990, 1990, 12/06/1991    Tdap 02/08/2006, 09/11/2018       Laboratory Results:   Lab Results   Component Value Date    WBC 3.4 05/29/2024    WBC 4.50 04/07/2023    HGB 13.9 05/29/2024    HGB 15.5 04/07/2023    HCT 40.5 05/29/2024    HCT 45.1 04/07/2023    MCV 93 05/29/2024    MCV 91 04/07/2023     05/29/2024     04/07/2023     Lab Results   Component Value Date    BUN 10 05/29/2024    BUN 13 04/07/2023     Lab Results   Component Value Date    GLUC 93 05/29/2024    GLUC 63 (L) 04/07/2023    ALT 9 05/29/2024    AST 16 05/29/2024     Lab Results   Component Value Date    TSH 1.460 05/29/2024     No results found for: \"HGBA1C\"    Lipid Profile:   No results found for: \"CHOL\"  Lab Results   Component Value Date    HDL 56 05/29/2024     No results found for: \"LDLC\"  Lab Results   Component Value Date    LDLCALC 90 05/29/2024     Lab Results   Component Value Date    TRIG 41 05/29/2024       Assessment/Plan "   1. Well adult exam  2. Tendonitis of foot  Assessment & Plan:  Reports chronic left foot pain ball of foot between 2nd/3rd toes.  On his feet a lot with work and thinks it was due to repetitive motion injury initially, onset 1yr ago.  Tried shoe inserts with increased pain, started wearing flat soled shoes with improvement, however no resolution.  Pain intermittent and persistent.    No pain today.  5/29/24 foot xray: Incidental note of bone island in the distal phalanx otherwise WNL  Will order MSK US foot and consider podiatry consult.   Orders:  -     US MSK limited; Future; Expected date: 06/13/2024  3. Elevated blood pressure reading  Assessment & Plan:  Bp checked 130/80 and 130/90 at today's OV.  Controlled at first OV.  Had coffee this morning at 7:30AM.    Check blood pressure outside of office; call if consistently above 130/90      1. Healthy male exam.  2. Patient Counseling:   Nutrition: Stressed importance of a well balanced diet, moderation of sodium/saturated fat, caloric balance and sufficient intake of fiber  Exercise: Stressed the importance of regular exercise with a goal of 150 minutes per week  Dental Health: Discussed daily flossing and brushing and regular dental visits   Sexuality: Discussed sexually transmitted infections, use of condoms and prevention of unintended pregnancy  Alcohol Use:  Recommended moderation of alcohol intake  Injury Prevention: Discussed Safety Belts, Safety Helmets, and Smoke Detectors    Immunizations reviewed.   Discussed benefits of screening .  Discussed the patient's BMI with him.  The BMI is above average; BMI management plan is completed  3. Cancer Screening   4. Labs reviewed/discussed   5. US left foot  6. Follow up next physical in 1 year.    LEORA Peterson

## 2024-06-13 ENCOUNTER — OFFICE VISIT (OUTPATIENT)
Dept: FAMILY MEDICINE CLINIC | Facility: HOSPITAL | Age: 34
End: 2024-06-13
Payer: COMMERCIAL

## 2024-06-13 VITALS
SYSTOLIC BLOOD PRESSURE: 130 MMHG | HEIGHT: 72 IN | WEIGHT: 201.4 LBS | HEART RATE: 59 BPM | DIASTOLIC BLOOD PRESSURE: 80 MMHG | BODY MASS INDEX: 27.28 KG/M2 | OXYGEN SATURATION: 99 %

## 2024-06-13 DIAGNOSIS — R03.0 ELEVATED BLOOD PRESSURE READING: ICD-10-CM

## 2024-06-13 DIAGNOSIS — Z00.00 WELL ADULT EXAM: Primary | ICD-10-CM

## 2024-06-13 DIAGNOSIS — M77.50 TENDONITIS OF FOOT: ICD-10-CM

## 2024-06-13 PROCEDURE — 99395 PREV VISIT EST AGE 18-39: CPT | Performed by: NURSE PRACTITIONER

## 2024-06-13 NOTE — PATIENT INSTRUCTIONS
Hydrogen peroxide on cotton ball place in ear for 5-10minutes then hot shower. No qtips!  Complete foot ultrasound  Check blood pressure outside of office.  Call if consistently above 130/90

## 2024-06-13 NOTE — ASSESSMENT & PLAN NOTE
Reports chronic left foot pain ball of foot between 2nd/3rd toes.  On his feet a lot with work and thinks it was due to repetitive motion injury initially, onset 1yr ago.  Tried shoe inserts with increased pain, started wearing flat soled shoes with improvement, however no resolution.  Pain intermittent and persistent.    No pain today.  5/29/24 foot xray: Incidental note of bone island in the distal phalanx otherwise WNL  Will order MSK US foot and consider podiatry consult.

## 2024-06-13 NOTE — ASSESSMENT & PLAN NOTE
Bp checked 130/80 and 130/90 at today's OV.  Controlled at first OV.  Had coffee this morning at 7:30AM.    Check blood pressure outside of office; call if consistently above 130/90

## 2024-10-14 ENCOUNTER — TELEPHONE (OUTPATIENT)
Age: 34
End: 2024-10-14

## 2024-10-14 NOTE — TELEPHONE ENCOUNTER
Patient called the RX Refill Line. Message is being forwarded to the office.     Patient is requesting a refill for an albuterol inhaler. Patient stated that he was prescribed this about 2 years ago through a telehealth visit that was set up through his insurance company.   Patient stated that he only uses it when he is sick to prevent him from getting Pneumonia. If this cannot be refilled patient would like to be contacted to inform if  an appointment is needed.   Patient pharmacy is   [facility]/pharm[facility] #[id number] - [place] - [address] [place]      Please contact patient at [phone]

## 2024-10-25 DIAGNOSIS — R06.2 WHEEZE: Primary | ICD-10-CM

## 2024-10-25 RX ORDER — ALBUTEROL SULFATE 90 UG/1
2 INHALANT RESPIRATORY (INHALATION) EVERY 6 HOURS PRN
Qty: 18 G | Refills: 5 | Status: SHIPPED | OUTPATIENT
Start: 2024-10-25

## 2025-02-28 ENCOUNTER — OFFICE VISIT (OUTPATIENT)
Dept: PODIATRY | Facility: CLINIC | Age: 35
End: 2025-02-28
Payer: COMMERCIAL

## 2025-02-28 VITALS — BODY MASS INDEX: 27.07 KG/M2 | WEIGHT: 201 LBS

## 2025-02-28 DIAGNOSIS — M20.5X2 HALLUX LIMITUS OF LEFT FOOT: ICD-10-CM

## 2025-02-28 DIAGNOSIS — M79.672 CHRONIC FOOT PAIN, LEFT: ICD-10-CM

## 2025-02-28 DIAGNOSIS — M65.272 CALCIFIC TENDINITIS OF LEFT FOOT: ICD-10-CM

## 2025-02-28 DIAGNOSIS — G89.29 CHRONIC FOOT PAIN, LEFT: ICD-10-CM

## 2025-02-28 DIAGNOSIS — M77.42 METATARSALGIA OF LEFT FOOT: Primary | ICD-10-CM

## 2025-02-28 PROCEDURE — 99203 OFFICE O/P NEW LOW 30 MIN: CPT | Performed by: PODIATRIST

## 2025-02-28 NOTE — PROGRESS NOTES
Metatarsalgia left foot secondary to hallux limitus.  Patient ID: Anders King is a 35 y.o. male Date of Birth 1990       Chief Complaint   Patient presents with    Foot Pain     Left - top of foot                Diagnosis:  1. Metatarsalgia of left foot  2. Hallux limitus of left foot  3. Calcific tendinitis of left foot  4. Chronic foot pain, left  -     Ambulatory Referral to Podiatry       Initial pedal examination with socks and shoes removed bilaterally.  I personally viewed patient's medical works, PCP visit note from 5/26/2024, left foot x-ray images and radiologist interpretation from 5/29/2024, I agree with radiologist, no acute osseous abnormalities noted.  I personally reviewed x-ray images with patient in room and reviewed biomechanics.  Today we discussed the biomechanics, etiology and treatment options of metatarsalgia left foot secondary to hallux limitus.  1/4 inch foam metatarsal pad was placed in plantar surface of left insert, he states he does feel some relief, extra pads were cut and give it.  We discussed proper sneakers, I referred him to the [employer] as he has tried over-the-counter arch supports and different sneakers likely with too much arch support and not made for his foot type, I explained to him it would be better to have a professional fit him.  Avoid being barefoot.  Patient understands and agrees with the plan and will follow-up in 6 to 8 weeks.        Subjective:   2/28/25 -Anders presents today upon referral from PCP for initial pedal examination, evaluation of care of pain in the left foot.  He was seen by PCP on 5/26/2024 at which time an x-ray was ordered and he was referred to our office.  He states he has tried different inserts, shoes, all of which fix 1 problem but make him uncomfortable in other areas.    5/26/24 - PCP HPI -   Reports chronic left foot pain ball of foot between 2nd/3rd toes.  On his feet a lot with work and thinks it was due to  repetitive motion injury initially, onset 1yr ago.  Tried shoe inserts with increased pain, started wearing flat soled shoes with improvement, however no resolution.  Pain intermittent and persistent.         The following portions of the patient's history were reviewed and updated as appropriate:     Past Medical History:   Diagnosis Date    Pneumonia     as a child & as a young adult    Reactive airway disease        Past Surgical History:   Procedure Laterality Date    NASAL RECONSTRUCTION      AL SEPTOPLASTY/SUBMUCOUS RESECJ W/WO CARTILAGE GRF N/A 4/28/2023    Procedure: SEPTOPLASTY/ BILATERAL INFERIOR TURBINATE REDUCTION;  Surgeon: Josue Lucas MD;  Location:  MAIN OR;  Service: ENT    AL SUBMUCOUS RESCJ INFERIOR TURBINATE PRTL/COMPL Bilateral 4/28/2023    Procedure: BILATERAL INFERIOR TURBINATE REDUCTION;  Surgeon: Josue Lucas MD;  Location:  MAIN OR;  Service: ENT    URETER SURGERY      reconstruction as a 4 y.o.    WISDOM TOOTH EXTRACTION  2007       Social History     Socioeconomic History    Marital status: Unknown     Spouse name: None    Number of children: None    Years of education: None    Highest education level: None   Occupational History    None   Tobacco Use    Smoking status: Never    Smokeless tobacco: Never   Vaping Use    Vaping status: Never Used   Substance and Sexual Activity    Alcohol use: Yes     Comment: 4 x month    Drug use: Never    Sexual activity: Yes     Partners: Female   Other Topics Concern    None   Social History Narrative    None     Social Drivers of Health     Financial Resource Strain: Not on file   Food Insecurity: Not on file   Transportation Needs: Not on file   Physical Activity: Not on file   Stress: Not on file   Social Connections: Not on file   Intimate Partner Violence: Not on file   Housing Stability: Not on file          Current Outpatient Medications:     albuterol (Ventolin HFA) 90 mcg/act inhaler, Inhale 2 puffs every 6 (six) hours as  needed for wheezing, Disp: 18 g, Rfl: 5    multivitamin (THERAGRAN) TABS, Take 1 tablet by mouth daily, Disp: , Rfl:     Allergies  Patient has no known allergies.    Family History   Problem Relation Age of Onset    Ulcerative colitis Mother     No Known Problems Brother            Objective:  Wt 91.2 kg (201 lb)   BMI 27.07 kg/m²     Review of Systems   Constitutional:  Negative for chills and fever.   HENT:  Negative for ear pain and sore throat.    Eyes:  Negative for pain and visual disturbance.   Respiratory:  Negative for cough and shortness of breath.    Cardiovascular:  Negative for chest pain and palpitations.   Gastrointestinal:  Negative for abdominal pain and vomiting.   Genitourinary:  Negative for dysuria and hematuria.   Musculoskeletal:  Negative for arthralgias and back pain.        Left foot pain   Skin:  Negative for color change and rash.   Neurological:  Negative for seizures and syncope.   All other systems reviewed and are negative.      Physical Exam  Constitutional:       Appearance: Normal appearance. He is normal weight.   HENT:      Head: Normocephalic and atraumatic.      Right Ear: External ear normal.      Left Ear: External ear normal.      Nose: Nose normal.      Mouth/Throat:      Mouth: Mucous membranes are moist.      Pharynx: Oropharynx is clear.   Eyes:      Conjunctiva/sclera: Conjunctivae normal.      Pupils: Pupils are equal, round, and reactive to light.   Cardiovascular:      Pulses: Normal pulses.           Dorsalis pedis pulses are 2+ on the right side and 2+ on the left side.        Posterior tibial pulses are 2+ on the right side and 2+ on the left side.   Pulmonary:      Effort: Pulmonary effort is normal.   Musculoskeletal:      Cervical back: Normal range of motion.      Right lower leg: No edema.      Left lower leg: No edema.   Feet:      Right foot:      Protective Sensation: 10 sites tested.  10 sites sensed.      Skin integrity: Skin integrity normal.       "Toenail Condition: Right toenails are normal.      Left foot:      Protective Sensation: 10 sites tested.  10 sites sensed.      Skin integrity: Skin integrity normal.      Toenail Condition: Left toenails are normal.      Comments: Left foot met primus elevatus, hallux limitus, fat pad atrophy submetatarsal heads 2 through 4, tenderness on palpation metatarsal heads 2 through 4, no inflammation or loss of plantar plate noted.  Skin:     General: Skin is warm and dry.      Capillary Refill: Capillary refill takes less than 2 seconds.   Neurological:      General: No focal deficit present.      Mental Status: He is alert and oriented to person, place, and time. Mental status is at baseline.   Psychiatric:         Mood and Affect: Mood normal.         Behavior: Behavior normal.         Thought Content: Thought content normal.         Judgment: Judgment normal.              No pertinent results found.      Heydi Cummings, JERRY, DPM, FACFAS    Portions of the record may have been created with voice recognition software. Occasional wrong word or \"sound a like\" substitutions may have occurred due to the inherent limitations of voice recognition software. Read the chart carefully and recognize, using context, where substitutions have occurred.  "

## (undated) DEVICE — SYRINGE 50ML LL

## (undated) DEVICE — NEURO PATTIES 1/2 X 3

## (undated) DEVICE — DECANTER: Brand: UNBRANDED

## (undated) DEVICE — SUT PROLENE 3-0 SH 36 IN 8522H

## (undated) DEVICE — EYE PADS 1 5/8"X2 5/8": Brand: MCKESSON

## (undated) DEVICE — WAND COBLATION PLASMA TURBINATOR REDUCTION

## (undated) DEVICE — MAYO STAND COVER: Brand: CONVERTORS

## (undated) DEVICE — TUBING SUCTION 5MM X 12 FT

## (undated) DEVICE — STERILE BETHLEHEM NASAL PACK: Brand: CARDINAL HEALTH

## (undated) DEVICE — PROXIMATE SKIN STAPLERS (35 WIDE) CONTAINS 35 STAINLESS STEEL STAPLES (FIXED HEAD): Brand: PROXIMATE

## (undated) DEVICE — SUT CHROMIC 4-0 RB-1 27 IN U203H

## (undated) DEVICE — ANTI-FOG SOLUTION WITH FOAM PAD: Brand: DEVON

## (undated) DEVICE — GLOVE SRG BIOGEL ECLIPSE 7

## (undated) DEVICE — INTENDED FOR TISSUE SEPARATION, AND OTHER PROCEDURES THAT REQUIRE A SHARP SURGICAL BLADE TO PUNCTURE OR CUT.: Brand: BARD-PARKER ® CARBON RIB-BACK BLADES

## (undated) DEVICE — SUCTION COAGULATOR 10FR FOOT CNTRL MEGADYNE

## (undated) DEVICE — SPLINT 1524050 5PK PAIR DOYLE II AIRWAY: Brand: DOYLE II ™

## (undated) DEVICE — NEEDLE 25G X 1 1/2

## (undated) DEVICE — SPECIMEN CONTAINER STERILE PEEL PACK

## (undated) DEVICE — HYDROPHILIC WOUND DRESSING WITH ZINC PLUS VITAMINS A AND B6.: Brand: DERMAGRAN®-B

## (undated) DEVICE — SYRINGE 10ML LL

## (undated) DEVICE — MAGNETIC INSTRUMENT PAD 16" X 20"; LARGE; DISPOSABLE: Brand: CARDINAL HEALTH